# Patient Record
(demographics unavailable — no encounter records)

---

## 2024-12-10 NOTE — REASON FOR VISIT
[Home] : at home, [unfilled] , at the time of the visit. [Medical Office: ([facility])___] : at the medical office located in  [Patient] : the patient [Self] : self [Follow-Up: _____] : a [unfilled] follow-up visit [This encounter was initiated by telehealth (audio with video) and converted to telephone (audio only) due to technical difficulties.] : This encounter was initiated by telehealth (audio with video) and converted to telephone (audio only) due to technical difficulties.

## 2024-12-10 NOTE — HISTORY OF PRESENT ILLNESS
[FreeTextEntry1] : Ms. CHRISTIANSON is a pleasant 24yo female who presents today after successful venous sinus stenting on 12/15/21.  Her pulsatile tinnitus has completely resolved.   She has occasional headaches.  She saw Dr. Armando in 5/2024 without papilledema.    She completely weaned off Diamox.

## 2024-12-10 NOTE — END OF VISIT
[Time Spent: ___ minutes] : I have spent [unfilled] minutes of time on the encounter which excludes teaching and separately reported services.
[Time Spent: ___ minutes] : I have spent [unfilled] minutes of time on the encounter which excludes teaching and separately reported services.
[id number]

## 2024-12-10 NOTE — ASSESSMENT
[FreeTextEntry1] : Impression: IIH with Failure/Intolerance of Medical Management s/p Successful Venous Sinus Stenting on 12/15/21 Pulsatile Tinnitus Completely Resolved Papilledema Resolved, Off Diamox  MRV Head w/wo 12/2024 reveals patent stent, no new stenosis  Ms. Charles continues to do well after venous sinus stenting in 2021.  She will call my office if she develops any recurrent papilledema or symptoms of IIH.   PLAN Follow Up with Me As Needed for Recurrent Signs/Symptoms of IIH

## 2024-12-10 NOTE — REASON FOR VISIT
[Home] : at home, [unfilled] , at the time of the visit. [Medical Office: ([facility])___] : at the medical office located in  [Patient] : the patient [Self] : self [Follow-Up: _____] : a [unfilled] follow-up visit [This encounter was initiated by telehealth (audio with video) and converted to telephone (audio only) due to technical difficulties.] : This encounter was initiated by telehealth (audio with video) and converted to telephone (audio only) due to technical difficulties.